# Patient Record
Sex: FEMALE | Race: WHITE | NOT HISPANIC OR LATINO
[De-identification: names, ages, dates, MRNs, and addresses within clinical notes are randomized per-mention and may not be internally consistent; named-entity substitution may affect disease eponyms.]

---

## 2020-06-12 ENCOUNTER — APPOINTMENT (OUTPATIENT)
Dept: GASTROENTEROLOGY | Facility: CLINIC | Age: 30
End: 2020-06-12

## 2020-12-06 ENCOUNTER — EMERGENCY (EMERGENCY)
Facility: HOSPITAL | Age: 30
LOS: 0 days | Discharge: HOME | End: 2020-12-06
Attending: EMERGENCY MEDICINE | Admitting: EMERGENCY MEDICINE
Payer: MEDICAID

## 2020-12-06 VITALS
RESPIRATION RATE: 19 BRPM | HEART RATE: 88 BPM | WEIGHT: 220.02 LBS | OXYGEN SATURATION: 99 % | HEIGHT: 72 IN | DIASTOLIC BLOOD PRESSURE: 93 MMHG | TEMPERATURE: 99 F | SYSTOLIC BLOOD PRESSURE: 170 MMHG

## 2020-12-06 DIAGNOSIS — H53.9 UNSPECIFIED VISUAL DISTURBANCE: ICD-10-CM

## 2020-12-06 DIAGNOSIS — Z88.0 ALLERGY STATUS TO PENICILLIN: ICD-10-CM

## 2020-12-06 DIAGNOSIS — Z98.82 BREAST IMPLANT STATUS: Chronic | ICD-10-CM

## 2020-12-06 PROCEDURE — 99282 EMERGENCY DEPT VISIT SF MDM: CPT

## 2020-12-06 NOTE — ED ADULT NURSE NOTE - NS ED NURSE RECORD ANOTHER HT AND WT
No change in previous pt assessment. D/C instructions, including follow up care given to pt. Pt verbalized understanding and denies further questions or needs at this time. Ambulatory to waiting room with steady gait. LESLIE Godfrey RN  08/28/19 2858 Yes

## 2020-12-06 NOTE — ED PROVIDER NOTE - NSFOLLOWUPINSTRUCTIONS_ED_ALL_ED_FT
Blurred Vision    WHAT YOU NEED TO KNOW:    Blurred vision is when you cannot see fine details. You may have blurred vision if you are nearsighted or farsighted and you need glasses. Blurred vision may be caused by a corneal abrasion (scratch on the cornea) or a corneal ulcer (open sore). You may have blurred vision if your eye came into contact with a chemical. A foreign body or infection may also cause blurred vision. Medical conditions, such as cataracts, glaucoma, detached retina, and nerve disorders can also cause blurred vision. Blurred vision may also be caused by a concussion or a tumor. If you have diabetes, you may develop diabetic retinopathy. Diabetic retinopathy damages the blood vessels of your retina. Eye Anatomy         DISCHARGE INSTRUCTIONS:    Return to the emergency department if:     You have weakness in an arm or leg, difficulty speaking or seeing, and a severe headache.      You have a fever, eye pain, or discharge.       You have a sudden loss of vision.     Contact your healthcare provider if:     Your blurred vision gets worse.      Your blurred vision is worse in the morning.      You have a sudden headache or eye pain.      Your eye has swelling, redness, or discharge.      You see floaters, flashes of light, fine dots, or cobweb shapes.      You have questions or concerns about your condition or care.     Medicines: You may need any of the following:     Prescription pain medicine may be given. Ask how to take this medicine safely.      Antibiotics help prevent or treat an eye infection caused by bacteria. It may be given as eyedrops or an ointment.      Take your medicine as directed. Contact your healthcare provider if you think your medicine is not helping or if you have side effects. Tell him of her if you are allergic to any medicine. Keep a list of the medicines, vitamins, and herbs you take. Include the amounts, and when and why you take them. Bring the list or the pill bottles to follow-up visits. Carry your medicine list with you in case of an emergency.    Manage your blurred vision: Your healthcare provider may ask you to do any of the following:    Use artificial tears to keep your eye moist or to soothe your irritated eye.      Apply a cool compress to decrease any swelling or pain. Wet a clean washcloth with cool water and place it on your eye. Use the cool compress as often as directed.       Wear an eye patch as directed to protect your eye.Eye Patch         Follow up with your healthcare provider as directed: You may need other eye exams and medicines. Write down your questions so you remember to ask them during your visits.       © Copyright [employer] 2019 All illustrations and images included in CareNotes are the copyrighted property of [employer]., Inc. or [employer].

## 2020-12-06 NOTE — ED PROVIDER NOTE - OBJECTIVE STATEMENT
31 y/o F with PM herniated discs presents with 3 few second episodes of b/l visual disturbance x 5 days-- described as occurring mostly when looking at a screen and seeing bright lights followed by curvy lines in both eyes. no palliating/provoking factors. no current symptoms.  no ha/difficulty ambulating/n/v/fever/neck pain/trauma.  +glasses wearer.  denies syncope, paraesthesias, cp/sob.

## 2020-12-06 NOTE — ED PROVIDER NOTE - PATIENT PORTAL LINK FT
You can access the FollowMyHealth Patient Portal offered by [facility] by registering at the following website: [email or web address]. By joining [employer]’s FollowMyHealth portal, you will also be able to view your health information using other applications (apps) compatible with our system.

## 2020-12-06 NOTE — ED ADULT NURSE NOTE - CHIEF COMPLAINT QUOTE
change in vison, tunnel vision, then subsided and felt nausea and weak, , then happened 2 more episodes, while it happens she feels very anxious,  oksana cortisone shot on wed in low back, first time wed, then saturday then today.

## 2020-12-06 NOTE — ED PROVIDER NOTE - PHYSICAL EXAMINATION
PHYSICAL EXAM:    GENERAL: Alert, appears stated age, well appearing, non-toxic  SKIN: Warm, pink and dry. MMM.   HEAD: NC, AT, no step offs   EYE: Normal lids/conjunctiva, PERRL, EOMI, OD OS OU 20/15 corrected. no fb/abrasion.   ENT: Normal hearing, patent oropharynx   NECK: +supple. No meningismus  Pulm: Bilateral BS, normal resp effort, no wheezes, stridor, or retractions  CV: RRR, no M/R/G, 2+and = radial pulses  Abd: soft, non-tender, non-distended  Mskel: no erythema, cyanosis, edema. no calf tenderness  Neuro: AAOx3, no sensory/motor deficits, CN 2-12 intact. No speech slurring, pronator drift, facial asymmetry. normal finger-to-nose b/l. 5/5 strength throughout. normal gait. negative romberg.

## 2020-12-06 NOTE — ED ADULT NURSE NOTE - HIV OFFER
No return call,  System will not allow me for some reason to approve or deny med  Can you just deny it     Laura Barrera RN, BS  Clinical Nurse Triage.     Opt out

## 2020-12-06 NOTE — ED PROVIDER NOTE - NS ED ROS FT
Review of Systems    Constitutional: (-) fever   Eyes/ENT: (+) vision changes  Cardiovascular: (-) chest pain, (-) syncope (-) palpitations  Respiratory: (-) cough, (-) shortness of breath  Gastrointestinal: (-) vomiting, (-) diarrhea (-) abdominal pain  Musculoskeletal: (-) neck pain,  Integumentary: (-) rash, (-) edema  Neurological: (-) headache, (-) confusion  Hematologic: (-) easy bruising   Allergic/Immunologic: (-) pruritus

## 2020-12-06 NOTE — ED PROVIDER NOTE - CARE PROVIDER_API CALL
Josiah Terrell)  Ophthalmology  [address] [place] [address]  Phone: [phone]  Fax: [phone]  Follow Up Time: 1-3 Days

## 2022-06-28 ENCOUNTER — EMERGENCY (EMERGENCY)
Facility: HOSPITAL | Age: 32
LOS: 0 days | Discharge: HOME | End: 2022-06-28
Attending: STUDENT IN AN ORGANIZED HEALTH CARE EDUCATION/TRAINING PROGRAM

## 2022-06-28 VITALS
HEART RATE: 67 BPM | SYSTOLIC BLOOD PRESSURE: 123 MMHG | RESPIRATION RATE: 16 BRPM | WEIGHT: 225.09 LBS | TEMPERATURE: 99 F | OXYGEN SATURATION: 100 % | HEIGHT: 72 IN | DIASTOLIC BLOOD PRESSURE: 87 MMHG

## 2022-06-28 VITALS
RESPIRATION RATE: 17 BRPM | TEMPERATURE: 99 F | DIASTOLIC BLOOD PRESSURE: 80 MMHG | HEART RATE: 70 BPM | SYSTOLIC BLOOD PRESSURE: 122 MMHG | OXYGEN SATURATION: 99 %
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
## 2022-06-28 DIAGNOSIS — Z88.1 ALLERGY STATUS TO OTHER ANTIBIOTIC AGENTS STATUS: ICD-10-CM

## 2022-06-28 DIAGNOSIS — S93.401A SPRAIN OF UNSPECIFIED LIGAMENT OF RIGHT ANKLE, INITIAL ENCOUNTER: ICD-10-CM
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
## 2022-06-28 DIAGNOSIS — M25.571 PAIN IN RIGHT ANKLE AND JOINTS OF RIGHT FOOT: ICD-10-CM

## 2022-06-28 DIAGNOSIS — Z88.4 ALLERGY STATUS TO ANESTHETIC AGENT: ICD-10-CM

## 2022-06-28 DIAGNOSIS — M25.561 PAIN IN RIGHT KNEE: ICD-10-CM

## 2022-06-28 DIAGNOSIS — Z98.82 BREAST IMPLANT STATUS: Chronic | ICD-10-CM

## 2022-06-28 DIAGNOSIS — S83.91XA SPRAIN OF UNSPECIFIED SITE OF RIGHT KNEE, INITIAL ENCOUNTER: ICD-10-CM

## 2022-06-28 DIAGNOSIS — Z88.0 ALLERGY STATUS TO PENICILLIN: ICD-10-CM

## 2022-06-28 PROCEDURE — 73630 X-RAY EXAM OF FOOT: CPT | Mod: 26,RT

## 2022-06-28 PROCEDURE — 73562 X-RAY EXAM OF KNEE 3: CPT | Mod: 26,RT

## 2022-06-28 PROCEDURE — 99284 EMERGENCY DEPT VISIT MOD MDM: CPT

## 2022-06-28 PROCEDURE — 73600 X-RAY EXAM OF ANKLE: CPT | Mod: 26,RT

## 2022-06-28 PROCEDURE — 73590 X-RAY EXAM OF LOWER LEG: CPT | Mod: 26,RT

## 2022-06-28 RX ORDER — OXYCODONE HYDROCHLORIDE 5 MG/1
1 TABLET ORAL
Qty: 3 | Refills: 0
Start: 2022-06-28

## 2022-06-28 RX ORDER — ACETAMINOPHEN 500 MG
650 TABLET ORAL ONCE
Refills: 0 | Status: COMPLETED | OUTPATIENT
Start: 2022-06-28 | End: 2022-06-28

## 2022-06-28 RX ORDER — IBUPROFEN 200 MG
600 TABLET ORAL ONCE
Refills: 0 | Status: COMPLETED | OUTPATIENT
Start: 2022-06-28 | End: 2022-06-28

## 2022-06-28 RX ADMIN — Medication 600 MILLIGRAM(S): at 02:40

## 2022-06-28 RX ADMIN — Medication 650 MILLIGRAM(S): at 02:10

## 2022-06-28 NOTE — ED ADULT TRIAGE NOTE - CHIEF COMPLAINT QUOTE
pt presents to the ED c/o right knee pain/swelling and right ankle pain/swelling s/p slid and fell, [place]. denies: head trauma, LOC

## 2022-06-28 NOTE — ED PROVIDER NOTE - PHYSICAL EXAMINATION
General: alert, conversant, looks well, vitals reassuring  Head: atraumatic, normocephalic  Eyes: PERRL, EOMI, no scleral icterus  ENT: no epistaxis, moist mucous membranes, normal phonation, airway patent  Neck: full ROM, no midline ttp  CV: RRR, HDS  Pulm: no respiratory distress, no increased WOB  GI: abd soft, non tender, no guarding/rebound/masses  Back: normal ROM, no midline ttp, no signs of trauma  Extremities: R knee with swelling, likely effusion, ttp medially. Joint stable. No marked laxity. R ankle with lateral malleolar ttp/edema, some ecchymosis. Palpable DP pulse. Wiggling toes. no mid shaft tibia/fibular ttp  LLE/LUE unremarkable.    Neuro: alert, oriented x3, moving all extremities, interactive, normal speech/memory   Derm: warm, dry, normal color, no rash/wounds

## 2022-06-28 NOTE — ED ADULT NURSE NOTE - NSIMPLEMENTINTERV_GEN_ALL_ED
Implemented All Fall Risk Interventions:  [place] to call system. Call bell, personal items and telephone within reach. Instruct patient to call for assistance. Room bathroom lighting operational. Non-slip footwear when patient is off stretcher. Physically safe environment: no spills, clutter or unnecessary equipment. Stretcher in lowest position, wheels locked, appropriate side rails in place. Provide visual cue, wrist band, yellow gown, etc. Monitor gait and stability. Monitor for mental status changes and reorient to person, place, and time. Review medications for side effects contributing to fall risk. Reinforce activity limits and safety measures with patient and family.

## 2022-06-28 NOTE — ED PROVIDER NOTE - PATIENT PORTAL LINK FT
You can access the FollowMyHealth Patient Portal offered by [facility] by registering at the following website: [email or web address]. By joining [employer]’s FollowMyHealth portal, you will also be able to view your health information using other applications (apps) compatible with our system.

## 2022-06-28 NOTE — ED ADULT NURSE NOTE - OBJECTIVE STATEMENT
Patient aaoc3. patient just returned from vacation from  and fell. Patient complaint of right knee and ankle pain at 9/10. Patient denies loc of any discomfort.

## 2022-06-28 NOTE — ED PROVIDER NOTE - CLINICAL SUMMARY MEDICAL DECISION MAKING FREE TEXT BOX
BERNARD Chiu, Attending: note by attg.    H/P as above.    Slip and fall with likely injury to R knee and R ankle. Plan for radiographs oral analgesia, splinting/crutches, discharge.

## 2022-06-28 NOTE — ED PROVIDER NOTE - OBJECTIVE STATEMENT
32 yoF, healthy, no PMHX, no major orthopedic injuries p/w R knee and R ankle pain after slip and fall in [place] approx 72 hours ago. Did not want to be seen in [place]. No head trauma. No other injuries. Pain severe in knee. No prior injuries to this leg. Taking OTC oral analgesia. Can ambulate but barely. Works as a .

## 2022-06-28 NOTE — ED PROVIDER NOTE - NSFOLLOWUPINSTRUCTIONS_ED_ALL_ED_FT
You have a bad sprain of your Right knee and a sprain of your Right ankle.    Fortunately no broken bones on what I can see on the films.    Use the splints and crutches as needed.     Take ibuprofen, tylenol for pain. Oxycodone for breakthrough pain.    Follow up with orthopedics.    Please return to ER for new or concerning symptoms.

## 2022-06-28 NOTE — ED PROVIDER NOTE - NSFOLLOWUPCLINICS_GEN_ALL_ED_FT
[facility] Sports Medicine  Sports Medicine  [address] [place] [address]  Phone: [phone]  Fax:     [facility] Orthopedic Surgery  Orthopedic Surgery  [address], 3rd & 4th [place] [address]  Phone: [phone]  Fax:

## 2022-06-28 NOTE — ED PROVIDER NOTE - PROGRESS NOTE DETAILS
BERNARD Chiu, Attending: no obvious fxs. Placed in knee immob and air cast. Crutches given. Oxy sent. Ortho f/u.

## 2023-09-17 ENCOUNTER — EMERGENCY (EMERGENCY)
Facility: HOSPITAL | Age: 33
LOS: 1 days | Discharge: HOME | End: 2023-09-17
Attending: STUDENT IN AN ORGANIZED HEALTH CARE EDUCATION/TRAINING PROGRAM | Admitting: STUDENT IN AN ORGANIZED HEALTH CARE EDUCATION/TRAINING PROGRAM
Payer: MEDICAID

## 2023-09-17 VITALS
DIASTOLIC BLOOD PRESSURE: 86 MMHG | SYSTOLIC BLOOD PRESSURE: 134 MMHG | RESPIRATION RATE: 16 BRPM | HEART RATE: 88 BPM | TEMPERATURE: 99 F | OXYGEN SATURATION: 96 %

## 2023-09-17 DIAGNOSIS — Z98.82 BREAST IMPLANT STATUS: Chronic | ICD-10-CM

## 2023-09-17 PROCEDURE — 99283 EMERGENCY DEPT VISIT LOW MDM: CPT

## 2023-09-17 NOTE — ED PROVIDER NOTE - NSFOLLOWUPCLINICS_GEN_ALL_ED_FT
[facility] - Ophthalmology  Ophthalmology  [address], [address]  [place] [address]  Phone: [phone]  Fax:     [facility] Ophthalmology  Ophthalmology  [address], [address]  [place] [address]  Phone: [phone]  Fax:

## 2023-09-17 NOTE — ED PROVIDER NOTE - OBJECTIVE STATEMENT
32yo F, denies medical history, presents with R eye redness and discomfort for 1 day. Reports that she was trying to vigorously remove a contact lens 3am yesterday, not realizing that it was no longer in her eye. Used Refresh tears throughout this process, did not wash out the eye. No vision changes after the fact but feels some discomfort and noticed red eye redness. No eye discharge, blurry vision, diplopia.

## 2023-09-17 NOTE — ED PROVIDER NOTE - NSFOLLOWUPINSTRUCTIONS_ED_ALL_ED_FT
You were seen in the Emergency Department for eye discomfort and redness after trying to removing a contact from your R eye that was not there. You received a fluorescein eye exam which showed no corneal abrasions or ulcers. You do not require further treatmenet. Do not wear contacts for the next couple of days and please follow up with an eye doctor.    1) Continue all previously prescribed medications as directed.    2) Follow up with your primary care physician - take copies of your results.    3) Return to the Emergency Department for worsening or persistent symptoms, and/or ANY NEW OR CONCERNING SYMPTOMS.

## 2023-09-17 NOTE — ED PROVIDER NOTE - CLINICAL SUMMARY MEDICAL DECISION MAKING FREE TEXT BOX
32yo F, denies medical history, presents with R eye redness and discomfort for 1 day. Vitals unremarkable. Physical exam significant for EOMI, PEERLA, 1mm subconjunctival hemorrhage in 10'o'clock position of R eye, Vision 20/20 with glasses. Concern for corneal abrasion, will check fluorescein eye exam, give ofloxacin drops if positive for ulcer.

## 2023-09-17 NOTE — ED PROVIDER NOTE - PROGRESS NOTE DETAILS
Caity PGY2: Pt was reassessed and is doing well. Fluorescein exam performed and no corneal abrasions or ulcerations visualized. Results, including any incidental findings, were discussed. Follow up and return precautions were discussed. Patient verbalized understanding

## 2023-09-17 NOTE — ED PROVIDER NOTE - ATTENDING CONTRIBUTION TO CARE
33-year-old female, denies past medical history, presents ED complaining of right eye redness/discomfort x1 day.  Patient reports she was trying to vigorously remove her contact lens last night not realizing that it was no longer in her eye.  Patient denies headache, blurry vision, double vision, dizziness, nausea/vomiting, eye discharge or any other symptoms at this time.    Vital signs stable.  Physical exam significant for well-appearing patient.  Small subconjunctival hemorrhage to the parallel position of the right eye.  Vision is 20/20 in bilateral eyes with glasses.  Pupils equal round and reactive to light.  Extraocular movements intact without tenderness.  Fluorescein exam without corneal abrasion/ulceration.    Low suspicion for globe rupture.  No corneal abrasion on exam.  No indication for antibiotic eyedrops at this time.  Will recommend to not wear contact lenses for the time being.  Will recommend outpatient Ortho follow-up.  Also recommending use of artificial eyedrops for lubricant.  Return precautions given.  Will DC at this time.

## 2023-09-17 NOTE — ED ADULT NURSE NOTE - OBJECTIVE STATEMENT
Received patient in Intake 13 c/o right eye redness and discomfort. Patient denies SOB, chest pain, fever. Patient is A&OX4, airway patent, breathing unlabored and even. Safety maintained. Received patient in Intake 13 c/o right eye redness and discomfort. Patient stated she was trying to remove contact lense which was already removed. Patient denies SOB, chest pain, fever. Patient is A&OX4, airway patent, breathing unlabored and even. Safety maintained. Received patient in Intake 13 c/o right eye redness and discomfort. Patient stated she was trying to remove contact lense not realizing it was already removed. Patient denies SOB, chest pain, fever. Patient is A&OX4, airway patent, breathing unlabored and even. Safety maintained.

## 2023-09-17 NOTE — ED ADULT TRIAGE NOTE - CHIEF COMPLAINT QUOTE
Pt. c/o redness and discomfort to right eye. States she was trying to get out her contact on Saturday morning not realizing it was already out. Denies vision changes.

## 2023-09-17 NOTE — ED PROVIDER NOTE - PHYSICAL EXAMINATION
Physical Exam:  Gen: NAD, AOx3, non-toxic appearing, able to ambulate without assistance  Head: NCAT  HEENT: EOMI, PEERLA, 1mm subconjunctival hemmorhage in 10'o'clock position of R eye, Vision 20/20 with glasses, tongue midline, oral mucosa moist  Lung: CTAB, no respiratory distress, no wheezes/rhonchi/rales B/L, speaking in full sentences  CV: RRR, no murmurs, rubs or gallops  Abd: soft, NT, ND, no guarding, no rigidity, no rebound tenderness, no CVA tenderness   MSK: no visible deformities, ROM normal in UE/LE, no back pain  Neuro: No focal sensory or motor deficits  Skin: Warm, well perfused, no rash, no leg swelling  Psych: normal affect, calm

## 2023-09-17 NOTE — ED PROVIDER NOTE - PATIENT PORTAL LINK FT
You can access the FollowMyHealth Patient Portal offered by [facility] by registering at the following website: [email or web address]. By joining [employer]’s FollowMyHealth portal, you will also be able to view your health information using other applications (apps) compatible with our system.

## 2023-12-08 ENCOUNTER — APPOINTMENT (OUTPATIENT)
Dept: OBGYN | Facility: CLINIC | Age: 33
End: 2023-12-08

## 2024-02-04 ENCOUNTER — EMERGENCY (EMERGENCY)
Facility: HOSPITAL | Age: 34
LOS: 1 days | Discharge: HOME | End: 2024-02-04
Attending: EMERGENCY MEDICINE | Admitting: EMERGENCY MEDICINE
Payer: MEDICAID

## 2024-02-04 VITALS
TEMPERATURE: 98 F | HEART RATE: 67 BPM | RESPIRATION RATE: 18 BRPM | OXYGEN SATURATION: 100 % | DIASTOLIC BLOOD PRESSURE: 90 MMHG | SYSTOLIC BLOOD PRESSURE: 141 MMHG

## 2024-02-04 DIAGNOSIS — Z98.82 BREAST IMPLANT STATUS: Chronic | ICD-10-CM

## 2024-02-04 PROCEDURE — 99284 EMERGENCY DEPT VISIT MOD MDM: CPT

## 2024-02-04 RX ORDER — DIPHENHYDRAMINE HCL 50 MG
2 CAPSULE ORAL
Qty: 10 | Refills: 0
Start: 2024-02-04 | End: 2024-02-08

## 2024-02-04 RX ORDER — IBUPROFEN 200 MG
1 TABLET ORAL
Qty: 21 | Refills: 0
Start: 2024-02-04 | End: 2024-02-10

## 2024-02-04 NOTE — ED PROVIDER NOTE - PATIENT PORTAL LINK FT
You can access the FollowMyHealth Patient Portal offered by [facility] by registering at the following website: [email or web address]. By joining [employer]’s FollowMyHealth portal, you will also be able to view your health information using other applications (apps) compatible with our system.

## 2024-02-04 NOTE — ED PROVIDER NOTE - NSFOLLOWUPINSTRUCTIONS_ED_ALL_ED_FT
[facility] - ENT  Otolaryngology (ENT)  [address] [place] [address]  Phone: [phone]    Mehran Manuel  OTOLARYNGOLOGY  [address], [address]  [place] [address]  Phone: [phone]  Fax: [phone]  Established Patient    Sinusitis    WHAT YOU NEED TO KNOW:    Sinusitis is inflammation or infection of your sinuses. It is most often caused by a virus. Acute sinusitis may last up to 12 weeks. Chronic sinusitis lasts longer than 12 weeks. Recurrent sinusitis means you have 4 or more times in 1 year.  Sinuses    DISCHARGE INSTRUCTIONS:    Return to the emergency department if:    Your eye and eyelid are red, swollen, and painful.    You cannot open your eye.    You have vision changes, such as double vision.    Your eyeball bulges out or you cannot move your eye.    You are more sleepy than normal, or you notice changes in your ability to think, move, or talk.    You have a stiff neck, a fever, or a bad headache.    You have swelling of your forehead or scalp.  Contact your healthcare provider if:    Your symptoms do not improve after 3 days.    Your symptoms do not go away after 10 days.    You have nausea and are vomiting.    Your nose is bleeding.    You have questions or concerns about your condition or care.  Medicines: Your symptoms may go away on their own. Your healthcare provider may recommend watchful waiting for up to 10 days before starting antibiotics. You may need any of the following:    Acetaminophen decreases pain and fever. It is available without a doctor's order. Ask how much to take and how often to take it. Follow directions. Read the labels of all other medicines you are using to see if they also contain acetaminophen, or ask your doctor or pharmacist. Acetaminophen can cause liver damage if not taken correctly. Do not use more than 4 grams (4,000 milligrams) total of acetaminophen in one day.    NSAIDs, such as ibuprofen, help decrease swelling, pain, and fever. This medicine is available with or without a doctor's order. NSAIDs can cause stomach bleeding or kidney problems in certain people. If you take blood thinner medicine, always ask your healthcare provider if NSAIDs are safe for you. Always read the medicine label and follow directions.    Nasal steroid sprays may help decrease inflammation in your nose and sinuses.    Decongestants help reduce swelling and drain mucus in the nose and sinuses. They may help you breathe easier.    Antihistamines help dry mucus in the nose and relieve sneezing.    Antibiotics help treat or prevent a bacterial infection.    Take your medicine as directed. Contact your healthcare provider if you think your medicine is not helping or if you have side effects. Tell him or her if you are allergic to any medicine. Keep a list of the medicines, vitamins, and herbs you take. Include the amounts, and when and why you take them. Bring the list or the pill bottles to follow-up visits. Carry your medicine list with you in case of an emergency.  Self-care:    Rinse your sinuses. Use a sinus rinse device to rinse your nasal passages with a saline (salt water) solution or distilled water. Do not use tap water. This will help thin the mucus in your nose and rinse away pollen and dirt. It will also help reduce swelling so you can breathe normally. Ask your healthcare provider how often to do this.    Breathe in steam. Heat a bowl of water until you see steam. Lean over the bowl and make a tent over your head with a large towel. Breathe deeply for about 20 minutes. Be careful not to get too close to the steam or burn yourself. Do this 3 times a day. You can also breathe deeply when you take a hot shower.    Sleep with your head elevated. Place an extra pillow under your head before you go to sleep to help your sinuses drain.    Drink liquids as directed. Ask your healthcare provider how much liquid to drink each day and which liquids are best for you. Liquids will thin the mucus in your nose and help it drain. Avoid drinks that contain alcohol or caffeine.    Do not smoke, and avoid secondhand smoke. Nicotine and other chemicals in cigarettes and cigars can make your symptoms worse. Ask your healthcare provider for information if you currently smoke and need help to quit. E-cigarettes or smokeless tobacco still contain nicotine. Talk to your healthcare provider before you use these products.  Prevent the spread of germs that cause sinusitis: Wash your hands often with soap and water. Wash your hands after you use the bathroom, change a child's diaper, or sneeze. Wash your hands before you prepare or eat food.  Handwashing    Follow up with your healthcare provider as directed: You may be referred to an ear, nose, and throat specialist. Write down your questions so you remember to ask them during your visits.

## 2024-02-04 NOTE — ED PROVIDER NOTE - CLINICAL SUMMARY MEDICAL DECISION MAKING FREE TEXT BOX
The patient is a 33y Female who has a past medical and surgery history of  breast augmentation PTED with darwin ear pain and chronic sinus congestion over the past month no fever chills + ear +popping+ pressure no relief from OTC meds temporizes onset of problem with URI a month ago. 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 Has had some dizziness tinnitus AOA s/s are worse in the right ear  Has ENT appointmant No lateralizing HA n,v, or throat pain    Vital Signs Stable   PE: as described;      IMPRESSION/RISK:  Dx=  Subacute sinusitis   Consideration include prb post URI s/s   Plan  will treat with decongestants steroids antibiotics NSAIDS and keep ENT appointment  RTED PRN

## 2024-02-04 NOTE — ED PROVIDER NOTE - OBJECTIVE STATEMENT
The patient is a 33y Female who has a past medical and surgery history of  breast augmentation PTED with darwin ear pain and chronic sinus congestion over the past month no fever chills + ear +popping+ pressure no relief from OTC meds temporizes onset of problem with URI a month ago. 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 Has had some dizziness tinnitus AOA s/s are worse in the right ear  Has ENT appointmant No lateralizing HA n,v, or throat pain

## 2024-02-04 NOTE — ED PROVIDER NOTE - ENMT, MLM
Airway patent, Nasal mucosa w/ mild erythema . Mouth with normal mucosa. Throat has no vesicles, no oropharyngeal exudates and uvula is midline. Prabhjot effusion  in TMs w/o bulging ear canals normal

## 2024-02-04 NOTE — ED ADULT TRIAGE NOTE - CHIEF COMPLAINT QUOTE
Pt with co pain to b/l ears x 10 days. pt co intermittent ringing in ears ,stuffiness' and intermittent feeling of being off balance  pt with steady gait in triage no nausea. Pt with no fever. cell phone/clothing

## 2024-09-24 NOTE — ED ADULT TRIAGE NOTE - LOCATION:
----- Message from Serenity York sent at 9/24/2024 10:45 AM CDT -----  Contact: Pt [phone]  Requesting an RX refill or new RX.  Is this a refill or new RX: Refill  RX name and strength (copy/paste from chart):  tirzepatide (MOUNJARO) 5 mg/0.5 mL PnIj  Is this a 30 day or 90 day RX: 30  Pharmacy name and phone # (copy/paste from chart):    [facility] [place] [facility] [address] [facility]  [address] [place] [address]  Phone: [phone] Fax: [phone]      Requesting an RX refill or new RX.  Is this a refill or new RX: Refill  RX name and strength (copy/paste from chart):  cholecalciferol, vitamin D3, (VITAMIN D3) 25 mcg (1,000 unit) capsule  Is this a 30 day or 90 day RX: 30  Pharmacy name and phone # (copy/paste from chart):    [facility] [place] [facility] [address] [facility]  [address] [place] [address]  Phone: [phone] Fax: [phone]    Patient LOV 7/29/24 and has appt to see Dr Navarro on 10/29/24    Please call and advise.    Thank You  
Lov 7-  
Left arm;